# Patient Record
Sex: MALE | Race: OTHER | NOT HISPANIC OR LATINO | Employment: OTHER | URBAN - METROPOLITAN AREA
[De-identification: names, ages, dates, MRNs, and addresses within clinical notes are randomized per-mention and may not be internally consistent; named-entity substitution may affect disease eponyms.]

---

## 2019-01-10 ENCOUNTER — NEW PATIENT COMPREHENSIVE (OUTPATIENT)
Dept: URBAN - METROPOLITAN AREA CLINIC 36 | Facility: CLINIC | Age: 49
End: 2019-01-10

## 2019-01-10 DIAGNOSIS — H52.7: ICD-10-CM

## 2019-01-10 PROCEDURE — 92004 COMPRE OPH EXAM NEW PT 1/>: CPT

## 2019-01-10 PROCEDURE — 92015 DETERMINE REFRACTIVE STATE: CPT

## 2019-01-10 PROCEDURE — 92310-5 LEVEL 5 CONTACT LENS MANAGEMENT

## 2019-01-10 ASSESSMENT — VISUAL ACUITY
OD_CC: J1+
OS_CC: J1+
OD_SC: J6
OD_SC: 20/20
OS_SC: J6
OS_SC: 20/20

## 2019-01-10 ASSESSMENT — TONOMETRY
OS_IOP_MMHG: 17
OD_IOP_MMHG: 16

## 2019-01-11 ENCOUNTER — CONTACT LENS FOLLOW UP (OUTPATIENT)
Dept: URBAN - METROPOLITAN AREA CLINIC 36 | Facility: CLINIC | Age: 49
End: 2019-01-11

## 2019-01-11 DIAGNOSIS — H52.7: ICD-10-CM

## 2019-01-11 PROCEDURE — 92310F

## 2019-01-11 ASSESSMENT — VISUAL ACUITY
OD_SC: 20/20
OS_SC: 20/20

## 2022-03-15 ENCOUNTER — COMPREHENSIVE EXAM (OUTPATIENT)
Dept: URBAN - METROPOLITAN AREA CLINIC 36 | Facility: CLINIC | Age: 52
End: 2022-03-15

## 2022-03-15 DIAGNOSIS — H52.7: ICD-10-CM

## 2022-03-15 DIAGNOSIS — H04.123: ICD-10-CM

## 2022-03-15 PROCEDURE — 92015 DETERMINE REFRACTIVE STATE: CPT

## 2022-03-15 PROCEDURE — 92014 COMPRE OPH EXAM EST PT 1/>: CPT

## 2022-03-15 ASSESSMENT — TONOMETRY
OD_IOP_MMHG: 16
OS_IOP_MMHG: 16

## 2022-03-15 ASSESSMENT — VISUAL ACUITY
OD_PH: 20/20
OD_SC: J12
OS_CC: J1
OS_SC: J12
OD_CC: J1
OS_SC: 20/25-1
OD_SC: 20/50+2

## 2023-10-03 ENCOUNTER — COMPREHENSIVE EXAM (OUTPATIENT)
Dept: URBAN - METROPOLITAN AREA CLINIC 36 | Facility: CLINIC | Age: 53
End: 2023-10-03

## 2023-10-03 DIAGNOSIS — H52.7: ICD-10-CM

## 2023-10-03 DIAGNOSIS — H04.123: ICD-10-CM

## 2023-10-03 PROCEDURE — 92014 COMPRE OPH EXAM EST PT 1/>: CPT

## 2023-10-03 PROCEDURE — 92015 DETERMINE REFRACTIVE STATE: CPT

## 2023-10-03 ASSESSMENT — VISUAL ACUITY
OS_CC: 20/20-2
OD_CC: 20/30-2
OS_CC: J1
OU_CC: J1
OD_CC: J1

## 2023-10-03 ASSESSMENT — TONOMETRY
OS_IOP_MMHG: 16
OD_IOP_MMHG: 16

## 2024-06-19 ENCOUNTER — COMPREHENSIVE EXAM (OUTPATIENT)
Dept: URBAN - METROPOLITAN AREA CLINIC 36 | Facility: CLINIC | Age: 54
End: 2024-06-19

## 2024-06-19 DIAGNOSIS — H04.123: ICD-10-CM

## 2024-06-19 DIAGNOSIS — H52.7: ICD-10-CM

## 2024-06-19 PROCEDURE — 92014 COMPRE OPH EXAM EST PT 1/>: CPT

## 2024-06-19 PROCEDURE — 92015 DETERMINE REFRACTIVE STATE: CPT

## 2024-06-19 ASSESSMENT — VISUAL ACUITY
OD_SC: 20/50-2
OD_CC: 20/20
OS_SC: 20/30-1
OS_SC: J12
OS_CC: J1
OS_CC: 20/20
OD_CC: J1
OD_SC: J12

## 2024-06-19 ASSESSMENT — TONOMETRY
OS_IOP_MMHG: 19
OD_IOP_MMHG: 20